# Patient Record
Sex: FEMALE | Race: WHITE
[De-identification: names, ages, dates, MRNs, and addresses within clinical notes are randomized per-mention and may not be internally consistent; named-entity substitution may affect disease eponyms.]

---

## 2018-01-10 ENCOUNTER — HOSPITAL ENCOUNTER (INPATIENT)
Dept: HOSPITAL 68 - SDA | Age: 48
LOS: 2 days | DRG: 328 | End: 2018-01-12
Attending: SURGERY | Admitting: SURGERY
Payer: COMMERCIAL

## 2018-01-10 VITALS — DIASTOLIC BLOOD PRESSURE: 80 MMHG | SYSTOLIC BLOOD PRESSURE: 140 MMHG

## 2018-01-10 VITALS — DIASTOLIC BLOOD PRESSURE: 80 MMHG | SYSTOLIC BLOOD PRESSURE: 138 MMHG

## 2018-01-10 VITALS — WEIGHT: 184 LBS | HEIGHT: 66 IN | BODY MASS INDEX: 29.57 KG/M2

## 2018-01-10 DIAGNOSIS — K28.9: Primary | ICD-10-CM

## 2018-01-10 DIAGNOSIS — Z98.84: ICD-10-CM

## 2018-01-10 DIAGNOSIS — K21.9: ICD-10-CM

## 2018-01-10 PROCEDURE — C9399 UNCLASSIFIED DRUGS OR BIOLOG: HCPCS

## 2018-01-10 PROCEDURE — 0D164ZA BYPASS STOMACH TO JEJUNUM, PERCUTANEOUS ENDOSCOPIC APPROACH: ICD-10-PCS | Performed by: SURGERY

## 2018-01-10 NOTE — OPERATIVE REPORT
Operative/Inv Procedure Report
Surgery Date: 01/10/18
Name of Procedure:
Laparoscopic revision/redo of gastrojejunostomy, trimming of the pouch, EGD
Pre-Operative Diagnosis:
Chronic marginal ulcer, dysphagia, GERD, s/p LRYGB
Post-Operative Diagnosis:
Same
Estimated Blood Loss: 50ml to 100ml
Surgeon/Assistant:
Mau Mclaughlin DO, MD
Anesthesia: general endotracheal tube
IV Fluids:
1200 cc
Drains:
10 Fr RUQ KAI Drain
Specimens:
Gastrojejunostomy with pouch
Complications:
None
Condition:
Stable
Operative Indication:
This is a 47-year-old female who presented to the office with dysphagia and 
upper abdominal pain.  The patient is status post laparoscopic gastric bypass 
several years ago.  Patient had an upper endoscopy approximately a year ago that
showed a small marginal ulcer.  Patient has recently been taking medications for
very long time for the chronic ulcer.  Repeat upper endoscopy showed a worsening
ulcer with a large crater.  Given the chronicity of the ulcer and nonhealing 
with proper medications I discussed with the patient a laparoscopic revision of 
her gastrojejunostomy.  All risks including but not limited to bleeding, 
infection, leak, stricture, recurrence of marginal ulcers, injury to surrounding
bowel/esophagus/stomach/liver/spleen, dumping syndrome, DVT/PE, and mortality of
1/1000 patients were discussed in detail.  The patient understood everything and
decided to proceed.
 
Operative/Procedure Note
Note:
The patient was brought to the operating room and placed on the table in supine 
position.  Venodyne stockings were placed and adequate general endotracheal 
anesthesia was obtained.  The patient was prepped and draped in standard 
surgical fashion.  Began the procedure by making a 2 cm transverse incision 
supraumbilically and slightly to the left of the midline.  Then using a 12 mm 
clear Visiport and a 10 mm 0 laparoscope the abdominal cavity was accessed.  
Great care was taken to go through the anterior rectus sheath, the posterior 
rectus sheath, and through the peritoneum.  Once we entered the peritoneum the 
abdominal cavity was insufflated to 15 mmHg.  Upon initial examination no 
obvious gross pathology was seen.  Adhesions were noted between the gastric 
pouch/gastrojejunostomy and the liver.  Accessory trocars were placed, 5 mm in 
the epigastrium for the Mariam liver retractor.  The liver retractor was 
inserted and the liver was retracted anteriorly exposing the hiatus which was 
behind several adhesions.  5 mm ports are placed in the right and left upper 
quadrant, 5 mm left lateral port and a 12 mm right lateral port.  Procedure 
began by mobilizing the pouch and the jejunum off of the underside of the liver.
 This was done using Harmonic scalpel, Endo Laurita, and blunt dissection.  Once 
all the adhesions were taken down the hiatus was adequately visualized, no 
obvious hiatal hernia was seen.  Dissection then proceeded to isolating the 
gastric pouch from the gastric remnant and isolating the proximal jejunum just 
distal to the gastrojejunostomy.  This was all done using Harmonic scalpel and 
EndoShears.  The gastric pouch was noted to have a large lateral outpouching, 
which appeared as the fundus.  Petersons defect was closed, it had to be opened 
to allow adequate mobilization of the jejunum.  Once everything was adequately 
mobilized once again the pouch did appear large.  An upper endoscopy was 
performed to assure that the gastrojejunostomy was excised.  The 
gastrojejunostomy was visualized with an upper endoscopy and the chronic ulcer 
was visualized as well.  After confirmation that we have adequate room to excise
a gastrojejunostomy, we began excising it using Endo NANCY staplers.  At first the
jejunum was transected just distal to the anastomosis using 60 mm tan staple 
load.  The mesentery was divided using Harmonic scalpel maintaining hemostasis. 
The gastric pouch was then resected just proximal to the gastrojejunostomy using
a 60 mm purple staple load.  An Дмитрий tube was then placed and the pouch was 
trimmed laterally using 60 mm purple staple loads as well.  At the completion 
the gastrojejunostomy with the pouch were removed off to the side and the new 
pouch appeared small without any fundus.  The staple line on the gastric pouch 
was then oversewn using 2-0 Vicryl suture in a running fashion.  Following this 
the jejunum was coming up to the gastric pouch with no tension.  A in enterotomy
was made on the jejunum approximately 5 cm from the staple line snf between 
the mesenteric and antimesenteric side.  And then a gastrotomy was made 
posterior to the staple line on the gastric pouch.  Following this a side to 
side gastrojejunostomy was created using a 45 mm purple staple load 
approximately 3-3-1/2 cm in diameter.  The Дмитрий tube was passed through the 
common enterotomy and the common enterotomy was closed using 2-0 Vicryl suture 
in a running fashion double layer.  Following this a air and a methylene blue 
leak test were performed, both were negative for leaks.  We once again examined 
our anastomosis some bleeding was noted at the jejunum staple line and that was 
controlled using endoclips.  Following this another upper endoscopy was 
performed.  The pouch was noted to be much smaller without fundus, and the 
anastomosis appeared widely patent.  No obvious bleeding was noted.  The 
endoscope was removed.  A 10 [place] KAI drain was placed through the right upper 
quadrant incision under the liver and over the spleen.  The specimen was removed
through the 12 mm right lateral port site.  The 12 mm port site fascia was 
closed using 0 Vicryl suture.  The abdomen was desufflated.  All ports removed 
under direct visualization, no obvious bleeding was noted.  Skin was closed 
using 4-0 Monocryl.  Steri-Strips and dressings were placed.  The patient was 
successfully extubated and transferred to recovery room in stable condition.  
The patient tolerated the procedure well with no Complications.
Findings:
Chronic ulcer at the GJ, large fundus on the gastric pouch, complete redo of the
gastrojejunostomy
CC:
Huseyin DAMON,Gordon

## 2018-01-10 NOTE — SURG SHORT-STAY <48HRS DIS SUM
Visit Information
 
Visit Dates
Admission Date:
01/10/18
 
Discharge Date:
1/12/18
 
 
Surgical Short Stay DC Summary
Admission Diagnosis:
Chronic marginal ulcer, dysphagia, GERD, s/p LRYGB
Final Diagnosis:
SAME AS ABOVE, S/P 
 
Surgery Date: 01/10/18
Name of Procedure:
Laparoscopic revision/redo of gastrojejunostomy, trimming of the pouch, EGD
 
Procedure(s):
Surgery Date: 01/10/18
Name of Procedure:
Laparoscopic revision/redo of gastrojejunostomy, trimming of the pouch, EGD
 
Summary/Significant Findings:
Electively scheduled laparoscopic revision/redo of gastrojejunostomy, trimming 
of the pouch, EGD on 1/10/18 by  for a chronic marginal ulcer, 
dysphagia, GERD, s/p LRYGB. She was started on a stage 1 bariatric diet post-op,
and kept npo for an upper gi study on POD#1 morning to rule out leak and 
obstruction. Pain control transitioned from iv to oral medication. Protonix 
resumed post-operatively for continued GI prophylaxis. Tolerating stage 1 
bariatric diet prior to discharge home. Lovenox teaching done due to recent 
ankle fracture in the setting of this revisional gastric bypass surgery. KAI 
drain removed prior to discharge to home.
Condition at Discharge:
stable
Discharge Disposition: home or self care
Discharge instructions provided to patient/family: Yes
Post discharge follow-up plan:
one week follow up with Dr.Fridman santa teaching done prior to discharge home
Copies to:
Huseyin DAMON,Gordon

## 2018-01-10 NOTE — PN- BARIATRICS
Subjective
Subjective:
POST-OP NOTE
 
Reports epigastric discomfort and some nausea. Asking for sips. Reports pain 
uncontrolled currently. "I have a tolerance to pain medication" she reports, 
since she has been taking dilaudid at home recently for her ankle fracture. She 
is currently non-weight bearing on her left leg, and ambulating with a scooter 
device. She currently denies dizziness. No shortness of breath. No chest pains. 
 
Objective
Vital Signs and I&Os
Vital Signs
 
 
Date Time Temp Pulse Resp B/P B/P Pulse O2 O2 Flow FiO2
 
     Mean Ox Delivery Rate 
 
01/10 1500      97 Room Air  
 
01/10 1500 97.9 78 18 138/80  97 Room Air  
 
 
 Intake & Output
 
 
 01/10 1600 01/10 0800 01/10 0000 01/09 1600 01/09 0800 01/09 0000
 
Intake Total      
 
Output Total      
 
Balance      
 
       
 
Patient 184 lb   188 lb  
 
Weight      
 
Weight Reported by Patient     
 
Measurement      
 
Method      
 
 
 
Physical Exam:
General - alert & oriented, but sleepy. uncomfortable appearing. no acute 
distress.
Lungs - clear bilaterally. no w/r/r.
Cardiac - s1s2. reg.
Abdomen - soft. dressings c/d/i. KAI drain with scant bloody drainage. expected 
linh-incisional tenderness.
Extremities - warm bilaterally. left lower leg currently in cast, elevated on 
pillow. nvi. rle athrombic pump in place. calf nontender.
Current Medications:
 Current Medications
 
 
  Sig/Jonelle Start time  Last
 
Medication Dose Route Stop Time Status Admin
 
Acetaminophen 1,000 MG Q6 01/10 1800 AC 
 
N/A 1 UNIT IV 01/11 1214  
 
Acetaminophen 1,000 MG .STK-MED ONE 01/10 0658 DC 
 
  IV 01/10 0659  
 
Cefazolin Sodium 2,000 MG Q8H 01/10 1800 CAN 
 
  IV 01/11 0201  
 
Cefazolin Sodium 2 GM Q8H 01/10 1800 AC 
 
N/A 1 UNIT IV 01/11 0229  
 
Cefazolin Sodium 2,000 MG ONCE 01/10 0000 DC 
 
  IV 01/10 2359  
 
Dexamethasone 8 MG ONCE PRN 01/10 1545 AC 
 
  IV PUSH   
 
Dextrose/Lactated  1,000 ML Q8H 01/10 1515 AC 
 
Ringer's  IV   
 
Fentanyl Citrate 200 MCG .STK-MED ONE 01/10 0659 DC 
 
  IM 01/10 0700  
 
Heparin Sodium  5,000 UNIT Q8 01/10 2200 AC 
 
(Porcine)  SC   
 
Heparin Sodium  5,000 UNIT ONCE 01/10 0000 DC 
 
(Porcine)  SC 01/10 2359  
 
Hydrocodone Bitart/ 15 ML Q6P PRN 01/10 1515 AC 
 
Acetaminophen  PO   
 
Hydromorphone HCl 1 MG Q4P PRN 01/10 1515 AC 
 
  IV   
 
Hydromorphone HCl 2 MG .STK-MED ONE 01/10 0658 DC 
 
  IM 01/10 0659  
 
Midazolam HCl 4 MG .STK-MED ONE 01/10 0659 DC 
 
  IM 01/10 0700  
 
Ondansetron HCl 4 MG Q6P PRN 01/10 1515 AC 
 
  IV   
 
Pantoprazole Sodium 40 MG DAILY 01/11 1000 AC 
 
  IV   
 
Simethicone 40 MG Q6P PRN 01/10 1515 AC 
 
  PO   
 
 
 
 
Assessment/Plan
Assessment/Plan
This 47 year old female with hx of chronic marginal ulcer, dysphagia, GERD, 
gastric bypass status, is POD#0 s/p laparoscopic revision/redo of 
gastrojejunostomy, trimming of the pouch, EGD
 
stage 1 bariatric diet as tolerated
npo after midnight for upper gi study in am
iv dilaudid / iv tylenol prn pain control. hycet may need to be replaced by oral
dilaudid pills
antiemetics as needed. decadron if zofran ineffective
hep sc - dvt ppx. will likely need lovenox teaching for home given her 
immobility
PT eval - nwb lle (currently in cast)
continue protonix - gi ppx
f/u am labs
monitor KAI drain 
will d/w 
 
 
Core Measures
 
Venous Thromboembolism
VTE Risk Factors Surgery
No Mechanical VTE Prophylaxis d/t N/A MechProphylax Ordered
No VTE Pharm Prophylaxis d/t NA PharmProphylax ordered

## 2018-01-10 NOTE — PATIENT DISCHARGE INSTRUCTIONS
Discharge Instructions
 
General Discharge Information
You were seen/treated for:
chronic marginal ulcer, dysphagia, GERD, s/p LRYGB
You had these procedures:
Surgery Date: 01/10/18
Name of Procedure:
Laparoscopic revision/redo of gastrojejunostomy, trimming of the pouch, EGD
 
Watch for these problems:
fever>101.3, increased pain, redness/swelling/drainage, dizziness, shortness of 
breath, chest pains
No bath, but you may shower: Yes
Other wound care:
ok to remove outer dressings. leave white steri strips in place. expect some 
drainage from previous drain site, for which dry guaze may be placed over this 
and changed daily until it stops draining. keep incisions clean & dry.
 
Diet
Continue normal diet: No
Recommended Diet: Bariatric
Additional DIET Information:
bariatric stage diet advancement as directed, as tolerated
 
Activity
Full Activity/No Limits: No
Activity Self Limited: Yes
Pounds, do NOT lift more than: 10
Other activity limits:
no heavy lifting. no strenuous activity.
 
Acute Coronary Syndrome
 
Inclusion Criteria
At DC or during hospital stay patient has or had the following:
ACS DIAGNOSIS No
 
Discharge Core Measures
Meds if any: Prescribed or Continued at Discharge
Meds if any: NOT Prescribed or Continued at Discharge
 
Congestive Heart Failure
 
Inclusion Criteria
At DC or during hospital stay patient has or had the following:
CHF DIAGNOSIS No
 
Discharge Core Measures
Meds if any: Prescribed or Continued at Discharge
Meds if any: NOT Prescribed or Continued at Discharge
 
Cerebrovascular accident
 
Inclusion Criteria
At DC or during hospital stay patient has or had the following:
CVA/TIA Diagnosis No
 
Discharge Core Measures
Meds if any: Prescribed or Continued at Discharge
Meds if any: NOT Prescribed or Continued at Discharge
 
Venous thromboembolism
 
Inclusion Criteria
VTE Diagnosis No
VTE Type NONE
VTE Confirmed by (Test) NONE
 
Discharge Core Measures
- Per Current guidelines, there needs to be overlap
- treatment for the first 5 days of Warfarin therapy.
- If discharged on Warfarin prior to 5 days of
- overlap therapy, the patient will need to be
- assessed for post discharge needs including
- *Post discharge parental anticoagulation
- *Warfarin and/or parental anticoagulation education
- *Follow up date to check INR post discharge
At least 5 days overlap therapy as Inpatient No
Meds if any: Prescribed or Continued at Discharge
Note: Overlap Therapy is Warfarin and Anticoagulant
Meds if any: NOT Prescribed or Continued at Discharge

## 2018-01-10 NOTE — ADMISSION CORE MEASURES
Acute Coronary Syndrome (CM)
 
ACS Core Measures
Acute Coronary Syndrome Diagnosis No
 
Congestive Heart Failure (NEW)
 
CHF Core Measures
Congestive Heart Failure Diagnosis No
 
Cerebrovascular Accident (NEW)
 
CVA Core Measures
CVA/TIA Diagnosis No
 
Venous Thromboembolism AUG17
 
VTE Core [place] (View Protocol)
VTE Risk Factors Surgery
No Mechanical VTE Prophylaxis d/t N/A MechProphylax Ordered
No VTE Pharm Prophylaxis d/t NA PharmProphylax ordered
 
Problem List
 
As ranked by this Provider
includes Assessment & Plan
 1. Gastric bypass status for obesity
 
 2. Gastrojejunal ulcer
 
 
HOME MEDS
Home Med List
Hydromorphone HCl (Dilaudid) 2 MG TABLET   1 TAB PO TIDPRN FRACTURED ANKLE  (
Reported)
Pantoprazole Sodium (Protonix) 40 MG TABLET.DR   1 TAB PO BID GASTRIC ULCER  (
Reported)
Sucralfate (Carafate) 1 GRAM/10 ML ORAL.SUSP   10 ML PO 4 TIMES/DAY GASTRIC 
ULCER  (Reported)

## 2018-01-11 VITALS — DIASTOLIC BLOOD PRESSURE: 88 MMHG | SYSTOLIC BLOOD PRESSURE: 142 MMHG

## 2018-01-11 VITALS — SYSTOLIC BLOOD PRESSURE: 110 MMHG | DIASTOLIC BLOOD PRESSURE: 78 MMHG

## 2018-01-11 VITALS — SYSTOLIC BLOOD PRESSURE: 136 MMHG | DIASTOLIC BLOOD PRESSURE: 88 MMHG

## 2018-01-11 VITALS — SYSTOLIC BLOOD PRESSURE: 158 MMHG | DIASTOLIC BLOOD PRESSURE: 52 MMHG

## 2018-01-11 LAB
ABSOLUTE GRANULOCYTE CT: 6.1 /CUMM (ref 1.4–6.5)
BASOPHILS # BLD: 0 /CUMM (ref 0–0.2)
BASOPHILS NFR BLD: 0.4 % (ref 0–2)
EOSINOPHIL # BLD: 0 /CUMM (ref 0–0.7)
EOSINOPHIL NFR BLD: 0.1 % (ref 0–5)
ERYTHROCYTE [DISTWIDTH] IN BLOOD BY AUTOMATED COUNT: 14.9 % (ref 11.5–14.5)
GRANULOCYTES NFR BLD: 75.8 % (ref 42.2–75.2)
HCT VFR BLD CALC: 34.7 % (ref 37–47)
LYMPHOCYTES # BLD: 1.4 /CUMM (ref 1.2–3.4)
MCH RBC QN AUTO: 28.8 PG (ref 27–31)
MCHC RBC AUTO-ENTMCNC: 33.9 G/DL (ref 33–37)
MCV RBC AUTO: 84.8 FL (ref 81–99)
MONOCYTES # BLD: 0.5 /CUMM (ref 0.1–0.6)
PLATELET # BLD: 258 /CUMM (ref 130–400)
PMV BLD AUTO: 8.1 FL (ref 7.4–10.4)
RED BLOOD CELL CT: 4.09 /CUMM (ref 4.2–5.4)
WBC # BLD AUTO: 8 /CUMM (ref 4.8–10.8)

## 2018-01-11 NOTE — RADIOLOGY REPORT
EXAMINATION:
FL UPPER GI SERIES
 
CLINICAL INFORMATION:
Status post revision of gastric bypass. Evaluate for leak or obstruction.
 
COMPARISON:
None
 
TECHNIQUE:
A single water-soluble contrast upper GI series with fluoroscopy and spot
imaging was performed. The patient ingested 30 mL of Gastrografin without
difficulty and was evaluated in the upright and recumbent positions.
 
FINDINGS:
 radiograph: There are surgical clips in the upper abdomen. There is a
drain in the epigastric region.
 
SWALLOWING: Unremarkable.
 
ESOPHAGUS: Normal caliber and motility.
 
GASTROESOPHAGEAL REFLUX: None.
 
STOMACH/SMALL BOWEL: There appears to be a very small gastric remnant. Barium
flows easily through this small gastric remnant and into the proximal jejunum
through a patent gastrojejunostomy. There is no evidence of obstruction or
leak.
 
OTHER FINDINGS: None.
 
FLUOROSCOPY TIME:
1 minute and 22 seconds
 
NUMBER OF IMAGES:
1  image and 9 fluoroscopic spot images.
 
IMPRESSION:
No evidence of obstruction or leak.

## 2018-01-11 NOTE — PN- BARIATRICS
**See Addendum**
Subjective
Subjective:
Reports pain uncontrolled overnight, despite iv dilaudid freqency change to q2-3
prn pain. She reports hycet caused "burning pain". Her pain is mostly epigastric
pain "feels like my ulcer pain". Some nausea, which is aggravated by movement. 
Voiding without difficulty. No dizziness. No shortness of breath. No chest 
pains.
 
Objective
Vital Signs and I&Os
Vital Signs
 
 
Date Time Temp Pulse Resp B/P B/P Pulse O2 O2 Flow FiO2
 
     Mean Ox Delivery Rate 
 
01/11 0600 98.4 62 20 136/88  98   
 
01/11 0234 97.7 67 18 142/88  98   
 
01/10 2300       Room Air  
 
01/10 2215 98.2 62 20 140/80  95 Room Air  
 
01/10 1900       Room Air  
 
01/10 1500      97 Room Air  
 
01/10 1500 97.9 78 18 138/80  97 Room Air  
 
 
 Intake & Output
 
 
 01/11 0800 01/11 0000 01/10 1600 01/10 0800 01/10 0000 01/09 1600
 
Intake Total 325 1265 155   
 
Output Total 800 1440    
 
Balance -475 -175 155   
 
       
 
Intake,  1025 125   
 
Intake, Oral 0 240 30   
 
Number  0    
 
Bowel      
 
Movements      
 
Output,  40    
 
Drainage      
 
Output, Urine 800 1400    
 
Patient   184 lb   188 lb
 
Weight      
 
Weight   Reported by Patient   
 
Measurement      
 
Method      
 
 
 
Physical Exam:
General - alert & oriented. tearful.
Lungs - clear bilaterally. no w/r/r.
Cardiac - s1s2. reg.
Abdomen - soft. dressings c/d/i. KAI drain with scant bloody drainage. expected 
linh-incisional tenderness.
Extremities - warm bilaterally. left lower leg currently in cast, elevated on 
pillow. nvi. rle athrombic pump in place. calf nontender.
Current Medications:
 Current Medications
 
 
  Sig/Jonelle Start time  Last
 
Medication Dose Route Stop Time Status Admin
 
Acetaminophen 1,000 MG Q6 01/10 1800 AC 01/11
 
N/A 1 UNIT IV 01/11 1214  0556
 
Cefazolin Sodium 2,000 MG Q8H 01/10 1800 CAN 
 
  IV 01/11 0201  
 
Cefazolin Sodium 2 GM Q8H 01/10 1800 DC 01/11
 
N/A 1 UNIT IV 01/11 0229  0228
 
Cefazolin Sodium 2,000 MG ONCE 01/10 0000 DC 
 
  IV 01/10 2359  
 
Dexamethasone 8 MG ONCE PRN 01/10 1545 AC 
 
  IV PUSH   
 
Dextrose/Lactated  1,000 ML Q8H 01/10 1515 AC 01/10
 
Ringer's  IV   2353
 
Heparin Sodium  5,000 UNIT Q8 01/10 2200 AC 01/11
 
(Porcine)  SC   0555
 
Heparin Sodium  5,000 UNIT ONCE 01/10 0000 DC 
 
(Porcine)  SC 01/10 2359  
 
Hydrocodone Bitart/ 15 ML Q6P PRN 01/10 1515 DC 01/10
 
Acetaminophen  PO   2353
 
Hydromorphone HCl 2 MG Q3P PRN 01/11 0745 UNVr 
 
  PO   
 
Hydromorphone HCl 4 MG Q3P PRN 01/11 0745 UNVr 
 
  PO   
 
Hydromorphone HCl 1 MG Q2-3 HRS AS NEEDED.. 01/10 2015 AC 01/11
 
  IV   0603
 
Hydromorphone HCl 1 MG Q4P PRN 01/10 1515 DC 01/10
 
  IV   1928
 
Ondansetron HCl 4 MG Q6P PRN 01/10 1515 AC 
 
  IV   
 
Pantoprazole Sodium 40 MG DAILY 01/11 1000 AC 
 
  IV   
 
Simethicone 40 MG Q6P PRN 01/10 1515 AC 01/11
 
  PO   0548
 
 
 
 
Results
Last 48 Hours of Labs:
 Laboratory Tests
 
 
 01/11
 
 0650
 
Chemistry 
 
  Sodium Pending
 
  Potassium Pending
 
  Chloride Pending
 
  Carbon Dioxide Pending
 
  Anion Gap Pending
 
  BUN Pending
 
  Creatinine Pending
 
  BUN/Creatinine Ratio Pending
 
  Glucose Pending
 
  Magnesium Pending
 
Hematology 
 
  CBC w Diff Pending
 
  WBC Pending
 
  RBC Pending
 
  Hgb Pending
 
  Hct Pending
 
  MCV Pending
 
  MCH Pending
 
  RDW Pending
 
  Plt Count Pending
 
  MPV Pending
 
  PUBS MCHC Pending
 
 
 
 
Assessment/Plan
Assessment/Plan
This 47 year old female with hx of chronic marginal ulcer, dysphagia, GERD, 
gastric bypass status, is POD#1 s/p laparoscopic revision/redo of 
gastrojejunostomy, trimming of the pouch, EGD
 
currently npo for upper gi study this morning. resume stage 1 diet if ugi 
negative for leak
d/c hycet. try dilaudid pills (she was taking 2mg three times daily at home for 
ankle fracture)
continue iv dilaudid / iv tylenol prn breakthrough pain
antiemetics as needed. decadron if zofran ineffective
hep sc - dvt ppx. will likely need lovenox teaching for home given her 
immobility
PT eval - nwb lle (currently in cast)
continue protonix - gi ppx
f/u am labs
monitor KAI drain 
will d/w 
 
 
Core Measures
 
Venous Thromboembolism
VTE Risk Factors Surgery
No Mechanical VTE Prophylaxis d/t N/A MechProphylax Ordered
No VTE Pharm Prophylaxis d/t NA PharmProphylax ordered

## 2018-01-12 VITALS — SYSTOLIC BLOOD PRESSURE: 130 MMHG | DIASTOLIC BLOOD PRESSURE: 88 MMHG

## 2018-01-12 VITALS — DIASTOLIC BLOOD PRESSURE: 80 MMHG | SYSTOLIC BLOOD PRESSURE: 138 MMHG

## 2018-01-12 NOTE — PN- BARIATRICS
Subjective
Subjective:
Pain controlled better with dilaudid. Upper gi study yesterday negative for leak
/ obstruction. Tolerating stage 1 diet. No nausea. Reports some gas pains. Out 
of bed with the scooter assistive device for her ankle fracture (shes nonweight 
bearing on her left leg). No dizziness. No shortness of breath. No chest pains. 
Voiding well. Passing some flatus. Understands lovenox injections, and has 
prescription for a week of lovenox filled. She states she has protonix, carafate
, and "plenty of dilaudid" at home, and therefore does not need any 
prescriptions filled at this time. She expects her  will be picking her 
up today after 4pm.
 
Objective
Vital Signs and I&Os
Vital Signs
 
 
Date Time Temp Pulse Resp B/P B/P Pulse O2 O2 Flow FiO2
 
     Mean Ox Delivery Rate 
 
01/12 0643 98.5 74 18 130/88  96 Room Air  
 
01/12 0600      96 Room Air  
 
01/11 2200       Room Air  
 
01/11 2147 98.0 79 18 110/78  96 Room Air  
 
01/11 1400      96 Room Air  
 
01/11 1342 98.0 70 20 158/52  96 Room Air  
 
01/11 1130       Room Air  
 
 
 Intake & Output
 
 
 01/12 0800 01/12 0000 01/11 1600 01/11 0800 01/11 0000 01/10 1600
 
Intake Total 892 1230 2165 1600 1115 155
 
Output Total 560 2579 662 5482 1440 
 
Balance 332 -90 1305 140 -325 155
 
       
 
Intake,  186 4929 1600 875 125
 
Intake, Oral  480 540 0 240 30
 
Number  0 0 0 0 
 
Bowel      
 
Movements      
 
Output, 60 40 60 60 40 
 
Drainage      
 
Output, Urine 500 0066 546 9506 1400 
 
Patient      184 lb
 
Weight      
 
Weight      Reported by Patient
 
Measurement      
 
Method      
 
 
 
Physical Exam:
General - alert & oriented x 3. comfortable. no acute distress.
Lungs - clear bilaterally. no w/r/r.
Cardiac - s1s2. reg.
Abdomen - soft. dressings stained but intact. KAI drain with serosang fluid. 
expected linh-incisional tenderness.
Extremities - warm bilaterally. left leg in cast. nvi. right calf nontender.
Current Medications:
 Current Medications
 
 
  Sig/Jonelle Start time  Last
 
Medication Dose Route Stop Time Status Admin
 
Acetaminophen 1,000 MG Q6 01/10 1800 DC 01/11
 
N/A 1 UNIT IV 01/11 1214  1135
 
Dexamethasone 8 MG ONCE PRN 01/10 1545 AC 
 
  IV PUSH   
 
Dextrose/Lactated  1,000 ML Q13H 01/12 0800 AC 
 
Ringer's  IV   
 
Dextrose/Lactated  1,000 ML Q8H 01/10 1515 DC 01/12
 
Ringer's  IV   0334
 
Heparin Sodium  5,000 UNIT Q8 01/10 2200 AC 01/12
 
(Porcine)  SC   0643
 
Hydromorphone HCl 1 MG Q4-6 PRN PRN 01/12 0745 AC 
 
  IV   
 
Hydromorphone HCl 2 MG Q3P PRN 01/11 0745 AC 
 
  PO   
 
Hydromorphone HCl 4 MG Q3P PRN 01/11 0745 AC 01/11
 
  PO   1534
 
Hydromorphone HCl 1 MG Q2-3 HRS AS NEEDED.. 01/10 2015 DC 01/12
 
  IV   0649
 
Ketorolac  30 MG ONCE ONE 01/11 1045 DC 01/11
 
Tromethamine  IV 01/11 1046  1132
 
Ketorolac  15 MG Q6-PRN PRN 01/11 1045 AC 
 
Tromethamine  IV   
 
Ondansetron HCl 4 MG Q6P PRN 01/10 1515 AC 
 
  IV   
 
Pantoprazole Sodium 40 MG DAILY 01/11 1000 AC 01/11
 
  IV   0819
 
Simethicone 40 MG Q6P PRN 01/10 1515 AC 01/12
 
  PO   0643
 
Sucralfate 1 GM Q12 PRN 01/11 2200 AC 01/11
 
  PO   2215
 
Sucralfate 1 GM Q6 01/11 1200 DC 01/11
 
  PO   1711
 
 
 
 
Results
Last 48 Hours of Labs:
 Laboratory Tests
 
 
 01/11
 
 0650
 
Chemistry 
 
  Sodium (137 - 145 mmol/L) 141
 
  Potassium (3.5 - 5.1 mmol/L) 3.7
 
  Chloride (98 - 107 mmol/L) 105
 
  Carbon Dioxide (22 - 30 mmol/L) 28
 
  Anion Gap (5 - 16) 9
 
  BUN (7 - 17 mg/dL) 8
 
  Creatinine (0.5 - 1.0 mg/dL) 0.7
 
  Estimated GFR (>60 ml/min) > 60
 
  BUN/Creatinine Ratio (7 - 25 %) 11.4
 
  Glucose (65 - 99 mg/dL) 97
 
  Magnesium (1.6 - 2.3 mg/dL) 1.8
 
Hematology 
 
  CBC w Diff NO MAN DIFF REQ
 
  WBC (4.8 - 10.8 /CUMM) 8.0
 
  RBC (4.20 - 5.40 /CUMM) 4.09  L
 
  Hgb (12.0 - 16.0 G/DL) 11.8  L
 
  Hct (37 - 47 %) 34.7  L
 
  MCV (81.0 - 99.0 FL) 84.8
 
  MCH (27.0 - 31.0 PG) 28.8
 
  RDW (11.5 - 14.5 %) 14.9  H
 
  Plt Count (130 - 400 /CUMM) 258
 
  MPV (7.4 - 10.4 FL) 8.1
 
  Gran % (42.2 - 75.2 %) 75.8  H
 
  Lymphocytes % (20.5 - 51.1 %) 17.2  L
 
  Monocytes % (1.7 - 9.3 %) 6.5
 
  Eosinophils % (0 - 5 %) 0.1
 
  Basophils % (0.0 - 2.0 %) 0.4
 
  Absolute Granulocytes (1.4 - 6.5 /CUMM) 6.1
 
  Absolute Lymphocytes (1.2 - 3.4 /CUMM) 1.4
 
  Absolute Monocytes (0.10 - 0.60 /CUMM) 0.5
 
  Absolute Eosinophils (0.0 - 0.7 /CUMM) 0
 
  Absolute Basophils (0.0 - 0.2 /CUMM) 0
 
  PUBS MCHC (33.0 - 37.0 G/DL) 33.9
 
 
 
 
Assessment/Plan
Assessment/Plan
This 47 year old female with hx of chronic marginal ulcer, dysphagia, GERD, 
gastric bypass status, is POD#2 s/p laparoscopic revision/redo of 
gastrojejunostomy, trimming of the pouch, EGD
 
tolerating bariatric diet
pain controlled better with dilaudid (she has pills at home from ankle fracture)
continue oob/ambulation
hep sc - dvt ppx. she has lovenox prescription filled for one more week and 
understands injections
continue protonix / carafate - gi ppx
KAI drain removed
d/c home today. her  to pick her up after 4pm today
will d/w 
 
 
Core Measures
 
Venous Thromboembolism
VTE Risk Factors Surgery
No Mechanical VTE Prophylaxis d/t N/A MechProphylax Ordered
No VTE Pharm Prophylaxis d/t NA PharmProphylax ordered